# Patient Record
Sex: FEMALE | Race: WHITE | NOT HISPANIC OR LATINO | Employment: OTHER | URBAN - NONMETROPOLITAN AREA
[De-identification: names, ages, dates, MRNs, and addresses within clinical notes are randomized per-mention and may not be internally consistent; named-entity substitution may affect disease eponyms.]

---

## 2024-01-24 ENCOUNTER — TELEPHONE (OUTPATIENT)
Dept: NEUROLOGY | Facility: CLINIC | Age: 24
End: 2024-01-24
Payer: COMMERCIAL

## 2024-01-25 ENCOUNTER — OFFICE VISIT (OUTPATIENT)
Dept: NEUROLOGY | Facility: CLINIC | Age: 24
End: 2024-01-25
Payer: COMMERCIAL

## 2024-01-25 VITALS
HEART RATE: 88 BPM | BODY MASS INDEX: 46.1 KG/M2 | WEIGHT: 270 LBS | HEIGHT: 64 IN | OXYGEN SATURATION: 98 % | SYSTOLIC BLOOD PRESSURE: 130 MMHG | DIASTOLIC BLOOD PRESSURE: 84 MMHG

## 2024-01-25 DIAGNOSIS — G43.E19 INTRACTABLE CHRONIC MIGRAINE WITH AURA AND WITHOUT STATUS MIGRAINOSUS: Primary | ICD-10-CM

## 2024-01-25 PROCEDURE — 99205 OFFICE O/P NEW HI 60 MIN: CPT

## 2024-01-25 RX ORDER — NORETHINDRONE ACETATE AND ETHINYL ESTRADIOL 1MG-20(21)
1 KIT ORAL EVERY 24 HOURS
COMMUNITY
Start: 2023-08-16

## 2024-01-25 RX ORDER — DIPHENOXYLATE HYDROCHLORIDE AND ATROPINE SULFATE 2.5; .025 MG/1; MG/1
1 TABLET ORAL DAILY
COMMUNITY

## 2024-01-25 RX ORDER — LEVOTHYROXINE SODIUM 0.1 MG/1
100 TABLET ORAL
COMMUNITY
Start: 2023-11-13 | End: 2025-01-19

## 2024-01-25 RX ORDER — RIMEGEPANT SULFATE 75 MG/75MG
75 TABLET, ORALLY DISINTEGRATING ORAL ONCE AS NEEDED
Qty: 2 TABLET | Refills: 0 | COMMUNITY
Start: 2024-01-25

## 2024-01-25 RX ORDER — CABERGOLINE 0.5 MG/1
0.5 TABLET ORAL 2 TIMES WEEKLY
COMMUNITY
Start: 2023-08-16 | End: 2025-01-22

## 2024-01-25 RX ORDER — METHOCARBAMOL 500 MG/1
500 TABLET, FILM COATED ORAL 4 TIMES DAILY PRN
COMMUNITY
Start: 2023-07-23

## 2024-01-25 RX ORDER — SERTRALINE HYDROCHLORIDE 100 MG/1
100 TABLET, FILM COATED ORAL DAILY
COMMUNITY

## 2024-01-25 RX ORDER — RIMEGEPANT SULFATE 75 MG/75MG
75 TABLET, ORALLY DISINTEGRATING ORAL ONCE AS NEEDED
Qty: 16 TABLET | Refills: 5 | Status: SHIPPED | OUTPATIENT
Start: 2024-01-25 | End: 2024-07-23

## 2024-01-25 NOTE — PROGRESS NOTES
Neurology Consult Note    [facility]  [address], [address]  [place] [address]  Phone: [phone]  Fax: [phone]    Referring Provider:   BARON Nguyen  Primary Care Provider:  Kerry Gifford APRN    Reason for Consult:  Migraine  Subjective        Kimberlee Lindquist presents to [facility]    History of Present Illness  24-year-old female referred to me by BARON Castaneda for evaluation of migraine. Patient saw Ms. Garcia on 11/14/2023 for 3-month follow-up.  Additional review of record shows patient 1 referral for rheumatology and neurology.  Additionally patient requested an MIGDALIA test for lupus.  Additionally a MRI brain with and without contrast was needed before an Endo appointment due to known pituitary tumor.  Patient also on referral for mental health for anxiety medication.  Her PHQ-9 score was an 8.  TRACY-7 score 20.  She was started on sertraline 50 mg tablet.  Apparently patient was on amitriptyline as well however she stopped taking that several months prior.  Patient did request a new neurology referral as she was not satisfied with her prior neurologist.    On 8/16/2023 patient had established care with [facility] in [facility].  Review of that record does show patient has a history of Hashimoto's with a tumor on the pituitary gland.  Patient does have a history of vitamin D deficiency along with iron deficient anemia.  Patient did see neurology in [place].  Reportedly patient is also legally blind with retina pigmentosa.  Patient is able to see vision peripherally bilaterally.    She is treated on Dostinex for about 2-3 years.  Initially in 2021 patient presented with galactorrhea.  Pituitary MRI was performed in 2021 which showed a 4 mm adenoma.  She last saw endocrinology on 1/19/2024. No worsening of migraines with medication. Known side effect of migraine headaches.     Today patient presents with  her significant other.  She is ambulatory with the assistance of a walking stick due to being legally blind.  She reports to me her migraines originate in 2013.  These were not associated with any menstrual cycle or onset of puberty.  She denies any neck or head trauma.  She can experience 2 types of migraines per her report.  Typically her migraines will start on the left side of her head at her left eye.  She describes them as a sword going through her head.  The pain will then radiate globally around her head.  Addition this can feel sharp, throbbing.  These are associated with nausea, light and sound sensitivity.  Currently she is having approximately 25 migraine days a month.  She has noted triggers to include eye straining and superstrong smells.  Not for much intervention she has tried include retreating to a dark room and ice therapy.  This provided modest benefit.  She has used over-the-counter medications in the past however stopped using these due to no benefit.  She does drink approximately 0 to 200 mg of caffeine daily.  She does use tobacco.  She is able to admit quantity as she smokes for tobacco with her Hookah.  She does use alcohol occasionally.  Approximately 2 drinks weekly.  She also reports no family history of migraines.    Addition she can have migraine clusters per her report.  She describes these as severe ice pick in the back of her head.    She has seen neurology in [facility] in the past.  She reports suboptimal benefit with her treatment.    She denies any plans or reproduction.  She is on birth control.  We did have an extensive discussion regards to migraine preventative medications and the effects on developing fetus.    Preventative medications tried:  Verapamil.  Patient was on for approximately 3 months.  Low benefit.  Had significant side effects of constipation.  Propranolol.  Patient was on for approximately 3 months.  No clinical benefit.  Amitriptyline.  Patient  was on for approximately 3 months.  No clinical benefit.  Cymbalta.  Patient was on for approximately 3 to 4 months.  No clinical benefit.  Had significant side effects including suicidal ideation.  Topiramate.  Patient was on for approximately 3 to 4 months.  No clinical benefit.  Patient stopped due to interactions with her birth control.    Abortive medications tried:  Sumatriptan.  Unable to tolerate significant side effects of nausea and vomiting.    Medications contraindicated:   Additional anticonvulsants due to being of childbearing age.  Cymbalta due to suicidal ideation  Additional triptan medications due to concurrent use of Dostinex.    There is no problem list on file for this patient.       Past Medical History:   Diagnosis Date    Anxiety     Hashimoto's disease     Iron deficiency anemia     Major depressive disorder     Migraine     Prolactinoma     Retinitis pigmentosa     Vitamin D deficiency         Social History     Socioeconomic History    Marital status: Single   Tobacco Use    Smoking status: Every Day     Passive exposure: Current    Tobacco comments:     Hookah since 18 years old   Substance and Sexual Activity    Alcohol use: Yes     Alcohol/week: 2.0 standard drinks of alcohol     Types: 2 Standard drinks or equivalent per week    Drug use: Never    Sexual activity: Yes     Partners: Male     Birth control/protection: Birth control pill        Allergies   Allergen Reactions    Sumatriptan Nausea And Vomiting and Other (See Comments)    Penicillins Rash     Brother with whole body rash (hives)          Current Outpatient Medications:     cabergoline (DOSTINEX) 0.5 MG tablet, Take 1 tablet by mouth 2 (Two) Times a Week., Disp: , Rfl:     Cholecalciferol 50 MCG (2000 UT) capsule, Take 1 capsule by mouth Daily., Disp: , Rfl:     levothyroxine (SYNTHROID, LEVOTHROID) 100 MCG tablet, Take 1 tablet by mouth Every Morning., Disp: , Rfl:     methocarbamol (ROBAXIN) 500 MG tablet, Take 1 tablet by  "mouth 4 (Four) Times a Day As Needed., Disp: , Rfl:     multivitamin (THERAGRAN) tablet tablet, Take 1 tablet by mouth Daily., Disp: , Rfl:     norethindrone-ethinyl estradiol FE (Blisovi FE 1/20) 1-20 MG-MCG per tablet, Take 1 tablet by mouth Daily., Disp: , Rfl:     sertraline (ZOLOFT) 100 MG tablet, Take 1 tablet by mouth Daily., Disp: , Rfl:     galcanezumab-gnlm (EMGALITY) 120 MG/ML auto-injector pen, Inject 2 mL under the skin into the appropriate area as directed 1 (One) Time for 1 dose., Disp: 2 mL, Rfl: 0    galcanezumab-gnlm (EMGALITY) 120 MG/ML auto-injector pen, Inject 1 mL under the skin into the appropriate area as directed Every 30 (Thirty) Days., Disp: 1.12 mL, Rfl: 11    Rimegepant Sulfate (Nurtec) 75 MG tablet dispersible tablet, Take 1 tablet by mouth 1 (One) Time As Needed (migraine) for up to 180 days., Disp: 16 tablet, Rfl: 5    Rimegepant Sulfate (Nurtec) 75 MG tablet dispersible tablet, Take 1 tablet by mouth 1 (One) Time As Needed (migraine)., Disp: 2 tablet, Rfl: 0       Objective   Vital Signs:   /84 (BP Location: Left arm, Patient Position: Sitting, Cuff Size: Adult)   Pulse 88   Ht 162.6 cm (64\")   Wt 122 kg (270 lb)   SpO2 98%   BMI 46.35 kg/m²       Physical Exam  Vitals and nursing note reviewed.   Constitutional:       General: She is not in acute distress.     Appearance: Normal appearance.   HENT:      Head: Normocephalic.   Eyes:      General: Lids are normal.      Extraocular Movements: Extraocular movements intact.      Pupils: Pupils are equal, round, and reactive to light.   Pulmonary:      Effort: Pulmonary effort is normal. No respiratory distress.   Skin:     General: Skin is warm and dry.   Neurological:      Mental Status: She is alert.      Motor: Motor strength is normal.     Deep Tendon Reflexes: Reflexes are normal and symmetric.   Psychiatric:         Speech: Speech normal.        Neurological Exam  Mental Status  Alert. Oriented to person, place, time and " situation. Speech is normal. Language is fluent with no aphasia.    Cranial Nerves  CN II: No peripheral vision.  Patient has known retinal disease..  CN III, IV, VI: Extraocular movements intact bilaterally. Normal lids and orbits bilaterally. Pupils equal round and reactive to light bilaterally.  CN V: Facial sensation is normal.  CN VII: Full and symmetric facial movement.  CN IX, X: Palate elevates symmetrically. Normal gag reflex.  CN XI: Shoulder shrug strength is normal.  CN XII: Tongue midline without atrophy or fasciculations.    Motor  Normal muscle bulk throughout. No fasciculations present. Normal muscle tone. The following abnormal movements were seen: Hypermobility of joints.   Strength is 5/5 throughout all four extremities.    Sensory  Light touch is normal in upper and lower extremities.     Reflexes  Deep tendon reflexes are 2+ and symmetric in all four extremities.    Right pathological reflexes: Ankle clonus absent.  Left pathological reflexes: Ankle clonus absent.    Gait  Casual gait is normal including stance, stride, and arm swing.      Result Review :   The following data was reviewed by: BARON Avina on 01/25/2024:         PROGRESS NOTES - SCAN - PROG NOTE_[facility]_11/14/23 (11/14/2023)     PROGRESS NOTES - SCAN - PROG NOTE_[facility]_08/16/23 (08/16/2023)                Impression:  Kimberlee Lindquist is a 24 y.o. female who presents for evaluation of migraine.  After evaluating the patient, her presentation is diagnostic for intractable chronic migraine.  Patient does have approximate 25 migraine days monthly.  These are described as throbbing and sharp.  Associate with nausea, light and sound sensitivity.  Patient has been on several preventative medications in the past with limited benefit.  I do feel patient's best course of action is to initiate CRGP injectables such as Emgality.  Additionally due to patient being on Dostinex  and having interaction with triptan medications class, I would recommend patient trialing Nurtec for abortive therapy.  Patient was agreements with plan of care.  Additionally discussed with patient she will need to notify our office if she has any plans for reproduction as medications can affect this.    Diagnoses and all orders for this visit:    1. Intractable chronic migraine with aura and without status migrainosus (Primary)  -     galcanezumab-gnlm (EMGALITY) 120 MG/ML auto-injector pen; Inject 2 mL under the skin into the appropriate area as directed 1 (One) Time for 1 dose.  Dispense: 2 mL; Refill: 0  -     galcanezumab-gnlm (EMGALITY) 120 MG/ML auto-injector pen; Inject 1 mL under the skin into the appropriate area as directed Every 30 (Thirty) Days.  Dispense: 1.12 mL; Refill: 11  -     Rimegepant Sulfate (Nurtec) 75 MG tablet dispersible tablet; Take 1 tablet by mouth 1 (One) Time As Needed (migraine) for up to 180 days.  Dispense: 16 tablet; Refill: 5  -     Rimegepant Sulfate (Nurtec) 75 MG tablet dispersible tablet; Take 1 tablet by mouth 1 (One) Time As Needed (migraine).  Dispense: 2 tablet; Refill: 0        Plan:  Emgality 240 mg injection once  Followed by Emgality 120 mg monthly injection 30 days later.  Nurtec 75 mg ODT.  Take 1 tab at onset of migraine.  Max dose 1 tablet per 24 hours  Avoid known triggers  Limit use of tobacco  Limit use of alcohol  Obtain prior medical records  Follow-up with PCP as scheduled  Follow-up with me in 6 weeks via telehealth.    The patient and I have discussed the plan of care and she is in full agreement at this time.   I spent a total of 60 minutes this encounter.  Includes reviewing prior medical records, obtaining prior medical records, obtaining a thorough HPI, assessment of patient, developing a plan of care with the patient and her significant other, patient and significant other discussion, patient and significant other education as well as  documentation.  Follow Up   Return in about 6 weeks (around 3/7/2024) for Migraine.            Bk Díaz, APRN  01/25/24  09:29 CST

## 2024-01-26 ENCOUNTER — PATIENT ROUNDING (BHMG ONLY) (OUTPATIENT)
Dept: NEUROLOGY | Facility: CLINIC | Age: 24
End: 2024-01-26
Payer: COMMERCIAL

## 2024-01-26 NOTE — PROGRESS NOTES
January 26, 2024    Hello, may I speak with Kimberlee Lindquist?    My name is adi       I am  with [facility]  [address]  [address]  Located within [facility] [address] [phone].    Before we get started may I verify your date of birth? 2000    I am calling to officially welcome you to our practice and ask about your recent visit. Is this a good time to talk? yes    Tell me about your visit with us. What things went well?  All of my issues got addressed.       We're always looking for ways to make our patients' experiences even better. Do you have recommendations on ways we may improve?  no    Overall were you satisfied with your first visit to our practice? yes       I appreciate you taking the time to speak with me today. Is there anything else I can do for you? no      Thank you, and have a great day.

## 2024-02-07 DIAGNOSIS — G43.E19 INTRACTABLE CHRONIC MIGRAINE WITH AURA AND WITHOUT STATUS MIGRAINOSUS: Primary | ICD-10-CM

## 2024-02-12 ENCOUNTER — TELEPHONE (OUTPATIENT)
Dept: NEUROLOGY | Facility: CLINIC | Age: 24
End: 2024-02-12
Payer: COMMERCIAL

## 2024-03-07 ENCOUNTER — TELEMEDICINE (OUTPATIENT)
Dept: NEUROLOGY | Facility: CLINIC | Age: 24
End: 2024-03-07
Payer: MEDICAID

## 2024-03-07 DIAGNOSIS — G43.E19 INTRACTABLE CHRONIC MIGRAINE WITH AURA AND WITHOUT STATUS MIGRAINOSUS: Primary | ICD-10-CM

## 2024-03-07 NOTE — PROGRESS NOTES
Neurology Consult Note    [facility]  [address], [address]  [place] [address]  Phone: [phone]  Fax: [phone]    Referring Provider:   No ref. provider found  Primary Care Provider:  Kerry Gifford APRN    Reason for Consult:  Migraine  Subjective        Kimberlee Lindquist presents to [facility]    History of Present Illness  24-year-old female who I see for chronic migraine.  She was last seen in clinic on 1/25/2024.  At that time we had elected to initiate Emgality for prevention.  Additionally had initiated Nurtec as an abortive agent.    Today patient was seen via telehealth.  Patient was at her home and I was in my office.    Patient today reports she is not able to get her Emgality loading dose.  Apparently her insurance denied this.  She has elected to save month 1 injection to use with months to as her loading dose.  She also reports good improvement with her Nurtec.  There is no problem list on file for this patient.       Past Medical History:   Diagnosis Date    Anxiety     Hashimoto's disease     Iron deficiency anemia     Major depressive disorder     Migraine     Prolactinoma     Retinitis pigmentosa     Vitamin D deficiency         Social History     Socioeconomic History    Marital status: Single   Tobacco Use    Smoking status: Every Day     Passive exposure: Current    Tobacco comments:     Hookah since 18 years old   Vaping Use    Vaping status: Never Used   Substance and Sexual Activity    Alcohol use: Yes     Alcohol/week: 2.0 standard drinks of alcohol     Types: 2 Standard drinks or equivalent per week    Drug use: Never    Sexual activity: Yes     Partners: Male     Birth control/protection: Birth control pill        Allergies   Allergen Reactions    Sumatriptan Nausea And Vomiting and Other (See Comments)    Penicillins Rash     Brother with whole body rash (hives)          Current Outpatient Medications:     cabergoline (DOSTINEX)  Pharmacist at [facility] pharmacy called stating \"I will not fill patient medication at my store, I do not fill comfortable.\"   0.5 MG tablet, Take 1 tablet by mouth 2 (Two) Times a Week., Disp: , Rfl:     Cholecalciferol 50 MCG (2000 UT) capsule, Take 1 capsule by mouth Daily., Disp: , Rfl:     levothyroxine (SYNTHROID, LEVOTHROID) 100 MCG tablet, Take 1 tablet by mouth Every Morning., Disp: , Rfl:     methocarbamol (ROBAXIN) 500 MG tablet, Take 1 tablet by mouth 4 (Four) Times a Day As Needed., Disp: , Rfl:     multivitamin (THERAGRAN) tablet tablet, Take 1 tablet by mouth Daily., Disp: , Rfl:     norethindrone-ethinyl estradiol FE (Blisovi FE 1/20) 1-20 MG-MCG per tablet, Take 1 tablet by mouth Daily., Disp: , Rfl:     Rimegepant Sulfate (Nurtec) 75 MG tablet dispersible tablet, Take 1 tablet by mouth 1 (One) Time As Needed (migraine) for up to 180 days., Disp: 16 tablet, Rfl: 5    Rimegepant Sulfate (Nurtec) 75 MG tablet dispersible tablet, Take 1 tablet by mouth 1 (One) Time As Needed (migraine)., Disp: 2 tablet, Rfl: 0    sertraline (ZOLOFT) 100 MG tablet, Take 1 tablet by mouth Daily., Disp: , Rfl:     galcanezumab-gnlm (EMGALITY) 120 MG/ML auto-injector pen, Inject 1 mL under the skin into the appropriate area as directed Every 30 (Thirty) Days. (Patient not taking: Reported on 3/7/2024), Disp: 1.12 mL, Rfl: 11       Objective   Vital Signs:   There were no vitals taken for this visit.    Limited exam due to telehealth  Physical Exam  Vitals and nursing note reviewed.   Constitutional:       Appearance: Normal appearance.   Pulmonary:      Effort: Pulmonary effort is normal. No respiratory distress.   Neurological:      Mental Status: She is alert.   Psychiatric:         Speech: Speech normal.        Neurological Exam  Mental Status  Alert. Oriented to person, place, time and situation. Speech is normal. Language is fluent with no aphasia.    Cranial Nerves  CN VII: Full and symmetric facial movement.      Result Review :   The following data was reviewed by: BARON Avina on 03/07/2024:         Progress Notes by Arjun  BARON Childers (01/25/2024 08:30)                  Impression:  Kimberlee Lindquist is a 24 y.o. female who presents for chronic migraine.  Unfortunately insurance has denied a loading dose of her Emgality.  She will take the loading dose next month when she gets her second injection.  She does enjoy Nurtec as it does decrease the severity of her pain.    Diagnoses and all orders for this visit:    1. Intractable chronic migraine with aura and without status migrainosus (Primary)        Plan:  Await loading dose of Emgality  Continue Emgality monthly injections  Continue Nurtec  Follow-up with PCP as scheduled  Follow-up me in 3 months or sooner if needed    The patient and I have discussed the plan of care and she is in full agreement at this time.     Follow Up   Return in about 3 months (around 6/7/2024) for Migraine.            BARON Avina  03/07/24  15:51 CST

## 2024-04-08 DIAGNOSIS — G43.E19 INTRACTABLE CHRONIC MIGRAINE WITH AURA AND WITHOUT STATUS MIGRAINOSUS: ICD-10-CM

## 2024-06-13 ENCOUNTER — TELEMEDICINE (OUTPATIENT)
Dept: NEUROLOGY | Facility: CLINIC | Age: 24
End: 2024-06-13
Payer: COMMERCIAL

## 2024-06-13 DIAGNOSIS — G43.E19 INTRACTABLE CHRONIC MIGRAINE WITH AURA AND WITHOUT STATUS MIGRAINOSUS: Primary | ICD-10-CM

## 2024-06-13 NOTE — PROGRESS NOTES
Neurology Consult Note    [facility] [place] [place] [facility]  [address], [address]  [place] [address]  Phone: [phone]  Fax: [phone]    Referring Provider:   No ref. provider found  Primary Care Provider:  eKrry Gifford APRN    Reason for Consult:  Chronic migraine  Subjective        Kimberlee Lindquist presents to [facility]    History of Present Illness  24-year-old female who I see for chronic migraine.  Currently have patient on Emgality and Nurtec.  She was last seen telehealth on 3/7/2024.  At that visit patient reported insurance denied a loading dose of Emgality.  She was saving her dose for which she gets to injectors at home to start.  Prior to starting regimen she was having approximately 25 migraine days monthly.    Today patient was seen via telehealth.  She was at home when I was in my office.  She reports me good improvement with the use of Emgality and Nurtec.  She has had 2 injections.  Her Emgality has decreased her migraine days down to 4 out of 30.  Additionally Nurtec does help with abortive agent.  She has noticed the last week she will have worsening migraines.  She reports good sleep.  No concerns or safety voiced.    Preventative medications tried:  Verapamil.  Patient was on for approximately 3 months.  Low benefit.  Had significant side effects of constipation.  Propranolol.  Patient was on for approximately 3 months.  No clinical benefit.  Amitriptyline.  Patient was on for approximately 3 months.  No clinical benefit.  Cymbalta.  Patient was on for approximately 3 to 4 months.  No clinical benefit.  Had significant side effects including suicidal ideation.  Topiramate.  Patient was on for approximately 3 to 4 months.  No clinical benefit.  Patient stopped due to interactions with her birth control.     Abortive medications tried:  Sumatriptan.  Unable to tolerate significant side effects of nausea and vomiting.     Medications contraindicated:    Additional anticonvulsants due to being of childbearing age.  Cymbalta due to suicidal ideation  Additional triptan medications due to concurrent use of Dostinex.  There is no problem list on file for this patient.       Past Medical History:   Diagnosis Date    Anxiety     Hashimoto's disease     Iron deficiency anemia     Major depressive disorder     Migraine     Prolactinoma     Retinitis pigmentosa     Vitamin D deficiency         Social History     Socioeconomic History    Marital status: Single   Tobacco Use    Smoking status: Every Day     Passive exposure: Current    Tobacco comments:     Hookah since 18 years old   Vaping Use    Vaping status: Never Used   Substance and Sexual Activity    Alcohol use: Yes     Alcohol/week: 2.0 standard drinks of alcohol     Types: 2 Standard drinks or equivalent per week    Drug use: Never    Sexual activity: Yes     Partners: Male     Birth control/protection: Birth control pill        Allergies   Allergen Reactions    Sumatriptan Nausea And Vomiting and Other (See Comments)    Penicillins Rash     Brother with whole body rash (hives)          Current Outpatient Medications:     cabergoline (DOSTINEX) 0.5 MG tablet, Take 1 tablet by mouth 2 (Two) Times a Week., Disp: , Rfl:     Cholecalciferol 50 MCG (2000 UT) capsule, Take 1 capsule by mouth Daily., Disp: , Rfl:     galcanezumab-gnlm (EMGALITY) 120 MG/ML auto-injector pen, Inject 1 mL under the skin into the appropriate area as directed Every 30 (Thirty) Days., Disp: 1.12 mL, Rfl: 11    levothyroxine (SYNTHROID, LEVOTHROID) 100 MCG tablet, Take 1 tablet by mouth Every Morning., Disp: , Rfl:     methocarbamol (ROBAXIN) 500 MG tablet, Take 1 tablet by mouth 4 (Four) Times a Day As Needed., Disp: , Rfl:     multivitamin (THERAGRAN) tablet tablet, Take 1 tablet by mouth Daily., Disp: , Rfl:     norethindrone-ethinyl estradiol FE (Blisovi FE 1/20) 1-20 MG-MCG per tablet, Take 1 tablet by mouth Daily., Disp: , Rfl:      Rimegepant Sulfate (Nurtec) 75 MG tablet dispersible tablet, Take 1 tablet by mouth 1 (One) Time As Needed (migraine) for up to 180 days., Disp: 16 tablet, Rfl: 5    Rimegepant Sulfate (Nurtec) 75 MG tablet dispersible tablet, Take 1 tablet by mouth 1 (One) Time As Needed (migraine)., Disp: 2 tablet, Rfl: 0    sertraline (ZOLOFT) 100 MG tablet, Take 1 tablet by mouth Daily., Disp: , Rfl:        Objective   Vital Signs:   There were no vitals taken for this visit.    Limited exam due to telehealth visit  Physical Exam  Vitals and nursing note reviewed.   Constitutional:       Appearance: Normal appearance.   HENT:      Head: Normocephalic.   Eyes:      General: Lids are normal.   Pulmonary:      Effort: Pulmonary effort is normal. No respiratory distress.   Skin:     General: Skin is warm and dry.   Neurological:      Mental Status: She is alert.   Psychiatric:         Speech: Speech normal.        Neurological Exam  Mental Status  Alert. Oriented to person, place, time and situation. Speech is normal. Language is fluent with no aphasia.    Cranial Nerves  CN III, IV, VI: Normal lids and orbits bilaterally.  CN VII: Full and symmetric facial movement.      Result Review :   The following data was reviewed by: BARON Avina on 2024:       Progress Notes by Bk Díaz APRN (2024 13:45)                  Impression:  Kimberlee Lindquist is a 24 y.o. female who presents for follow-up of chronic migraine.  She responded well to Emgality and Nurtec.  For the last week during the 4-week period, did recommend her to try to take a Nurtec every other day to help cover.    Diagnoses and all orders for this visit:    1. Intractable chronic migraine with aura and without status migrainosus (Primary)        Plan:  Continue Emgality 20 mg monthly injection for migraine prevention  Continue Nurtec 75 mg for migraine   During the last week before Emgality injection, take Nurtec 75 mg every other  day  Follow-up with PCP  Follow-up in our clinic in 6 months or sooner if needed    The patient and I have discussed the plan of care and she is in full agreement at this time.     Follow Up   No follow-ups on file.            Bk Díaz, APRN  06/13/24  13:30 CDT

## 2025-01-07 ENCOUNTER — TELEMEDICINE (OUTPATIENT)
Dept: NEUROLOGY | Facility: CLINIC | Age: 25
End: 2025-01-07
Payer: COMMERCIAL

## 2025-01-07 DIAGNOSIS — G43.E19 INTRACTABLE CHRONIC MIGRAINE WITH AURA AND WITHOUT STATUS MIGRAINOSUS: ICD-10-CM

## 2025-01-07 PROCEDURE — 99214 OFFICE O/P EST MOD 30 MIN: CPT

## 2025-01-07 RX ORDER — CHOLECALCIFEROL (VITAMIN D3) 25 MCG
CAPSULE ORAL
COMMUNITY

## 2025-01-07 NOTE — PROGRESS NOTES
Neurology Consult Note    [facility] [place] [facility]  [address], [address]  [place] [address]  Phone: [phone]  Fax: [phone]    Referring Provider:   No ref. provider found  Primary Care Provider:  Kerry Gifford APRN    Reason for Consult:  Chronic Migraine   Subjective        Kimberlee Lindquist presents to [facility]    History of Present Illness    24-year-old female seen for follow-up of chronic migraine.  Last seen in clinic 6/13/2024 via telemedicine.  Currently patient's migraine regimen is Emgality for prevention and Nurtec for abortive agent.  She was having approximate 25 migraine days monthly were able to get her down to 4/30.    Today patient presents via telemedicine.  She is at her home in [place] and I am in my office.  Reports to me continued control with her chronic migraines.  Continues to tolerate Emgality and Nurtec without difficulty.  Only reporting 2-3 migraine days a month.  Very satisfied this response.  No needs today.      Preventative medications tried:  Verapamil.  Patient was on for approximately 3 months.  Low benefit.  Had significant side effects of constipation.  Propranolol.  Patient was on for approximately 3 months.  No clinical benefit.  Amitriptyline.  Patient was on for approximately 3 months.  No clinical benefit.  Cymbalta.  Patient was on for approximately 3 to 4 months.  No clinical benefit.  Had significant side effects including suicidal ideation.  Topiramate.  Patient was on for approximately 3 to 4 months.  No clinical benefit.  Patient stopped due to interactions with her birth control.     Abortive medications tried:  Sumatriptan.  Unable to tolerate significant side effects of nausea and vomiting.     Medications contraindicated:   Additional anticonvulsants due to being of childbearing age.  Cymbalta due to suicidal ideation  Additional triptan medications due to concurrent use of Dostinex.  There is no problem list on  file for this patient.       Past Medical History:   Diagnosis Date    Anxiety     Hashimoto's disease     Iron deficiency anemia     Major depressive disorder     Migraine     Prolactinoma     Retinitis pigmentosa     Vitamin D deficiency         Social History     Socioeconomic History    Marital status: Single   Tobacco Use    Smoking status: Every Day     Passive exposure: Current    Tobacco comments:     Hookah since 18 years old   Vaping Use    Vaping status: Never Used   Substance and Sexual Activity    Alcohol use: Yes     Alcohol/week: 2.0 standard drinks of alcohol     Types: 2 Standard drinks or equivalent per week    Drug use: Never    Sexual activity: Yes     Partners: Male     Birth control/protection: Birth control pill        Allergies   Allergen Reactions    Sumatriptan Nausea And Vomiting and Other (See Comments)    Penicillins Rash     Brother with whole body rash (hives)          Current Outpatient Medications:     cabergoline (DOSTINEX) 0.5 MG tablet, Take 1 tablet by mouth 2 (Two) Times a Week., Disp: , Rfl:     galcanezumab-gnlm (EMGALITY) 120 MG/ML auto-injector pen, Inject 1 mL under the skin into the appropriate area as directed Every 30 (Thirty) Days., Disp: 1.12 mL, Rfl: 11    levothyroxine (SYNTHROID, LEVOTHROID) 100 MCG tablet, Take 1 tablet by mouth Every Morning., Disp: , Rfl:     methocarbamol (ROBAXIN) 500 MG tablet, Take 1 tablet by mouth 4 (Four) Times a Day As Needed., Disp: , Rfl:     multivitamin (THERAGRAN) tablet tablet, Take 1 tablet by mouth Daily., Disp: , Rfl:     norethindrone-ethinyl estradiol FE (Blisovi FE 1/20) 1-20 MG-MCG per tablet, Take 1 tablet by mouth Daily., Disp: , Rfl:     rimegepant sulfate (Nurtec) 75 MG tablet, Take 1 tablet by mouth 1 (One) Time As Needed (migraine)., Disp: 16 tablet, Rfl: 8    Cholecalciferol (Vitamin D-3) 25 MCG (1000 UT) capsule, Take  by mouth., Disp: , Rfl:     Cholecalciferol 50 MCG (2000 UT) capsule, Take 1 capsule by mouth  Daily., Disp: , Rfl:        Objective   Vital Signs:   There were no vitals taken for this visit.      Physical Exam  Vitals and nursing note reviewed.   Constitutional:       Appearance: Normal appearance.   Pulmonary:      Effort: No respiratory distress.   Neurological:      Mental Status: She is alert.   Psychiatric:         Speech: Speech normal.        Neurological Exam  Mental Status  Alert. Oriented to person, place, time and situation. Speech is normal. Language is fluent with no aphasia.    Cranial Nerves  CN VII: Full and symmetric facial movement.      Result Review :   The following data was reviewed by: BARON Avina on 2025:       Progress Notes by Bk Díaz APRN (2024 13:30)                  Impression:  Kimberlee Lindquist is a 24 y.o. female who presents for follow-up of chronic migraine.  She is stable with the use of Emgality and Nurtec.  We will make no changes to her regimen.    Diagnoses and all orders for this visit:    1. Intractable chronic migraine with aura and without status migrainosus  -     rimegepant sulfate (Nurtec) 75 MG tablet; Take 1 tablet by mouth 1 (One) Time As Needed (migraine).  Dispense: 16 tablet; Refill: 8        Plan:  Continue Emgality 120 mg monthly injection for chronic migraine prophylaxis  Continue Nurtec 75 mg ODT as needed for chronic migraine   Avoid known triggers  Follow-up with primary care as scheduled  Follow-up our clinic 6 months or sooner if needed    The patient and I have discussed the plan of care and she is in full agreement at this time.     Follow Up   Return in about 6 months (around 2025) for Migraine.            BARON Avina  25  13:38 CST

## 2025-04-20 DIAGNOSIS — G43.E19 INTRACTABLE CHRONIC MIGRAINE WITH AURA AND WITHOUT STATUS MIGRAINOSUS: ICD-10-CM

## 2025-04-22 ENCOUNTER — TELEPHONE (OUTPATIENT)
Dept: NEUROLOGY | Facility: CLINIC | Age: 25
End: 2025-04-22
Payer: COMMERCIAL

## 2025-04-22 DIAGNOSIS — G43.E19 INTRACTABLE CHRONIC MIGRAINE WITH AURA AND WITHOUT STATUS MIGRAINOSUS: ICD-10-CM

## 2025-04-22 RX ORDER — GALCANEZUMAB 120 MG/ML
INJECTION, SOLUTION SUBCUTANEOUS
Qty: 1 ML | Refills: 0 | OUTPATIENT
Start: 2025-04-22

## 2025-06-27 ENCOUNTER — TELEMEDICINE (OUTPATIENT)
Dept: NEUROLOGY | Facility: CLINIC | Age: 25
End: 2025-06-27
Payer: MEDICAID

## 2025-06-27 DIAGNOSIS — G43.E19 INTRACTABLE CHRONIC MIGRAINE WITH AURA AND WITHOUT STATUS MIGRAINOSUS: Primary | ICD-10-CM

## 2025-06-27 RX ORDER — DESVENLAFAXINE 100 MG/1
100 TABLET, EXTENDED RELEASE ORAL EVERY MORNING
COMMUNITY

## 2025-06-27 RX ORDER — TRAZODONE HYDROCHLORIDE 100 MG/1
100-200 TABLET ORAL NIGHTLY PRN
COMMUNITY

## 2025-06-27 NOTE — PROGRESS NOTES
Neurology Consult Note    [facility]  [address], [address]  [place] [address]  Phone: [phone]  Fax: [phone]    Referring Provider:   No ref. provider found  Primary Care Provider:  Kerry Gifford APRN    Reason for Consult:  Chronic migraine  Subjective        History of Present Illness  25-year-old female seen for the follow-up of chronic migraine.  Last seen in my clinic on 1/7/2025.  Reviewed that record shows that patient maintained on Emgality and Nurtec.  Previously she was having 25 migraine days monthly and we were able to get her down to 4 migraine days monthly.  She is reporting 2-3 migraines a month.    Today patient presents via telehealth.  She is at her home in [place] and I am in the office in [facility].  Reports to me since being seen last, still having good control of her migraines.  Averaging 2 migraines a month.  Identified triggers include changes in barometric pressure.  She notes continued success with Nurtec as her abortive agent.  She is also pleased with the Emgality.    Preventative medications tried:  Verapamil.  Patient was on for approximately 3 months.  Low benefit.  Had significant side effects of constipation.  Propranolol.  Patient was on for approximately 3 months.  No clinical benefit.  Amitriptyline.  Patient was on for approximately 3 months.  No clinical benefit.  Cymbalta.  Patient was on for approximately 3 to 4 months.  No clinical benefit.  Had significant side effects including suicidal ideation.  Topiramate.  Patient was on for approximately 3 to 4 months.  No clinical benefit.  Patient stopped due to interactions with her birth control.  Emgality.  Good clinical benefit.     Abortive medications tried:  Sumatriptan.  Unable to tolerate significant side effects of nausea and vomiting.  Nurtec.  Good clinical benefit.     Medications contraindicated:   Additional anticonvulsants due to being of childbearing  age.  Cymbalta due to suicidal ideation  Additional triptan medications due to concurrent use of Dostinex.    There is no problem list on file for this patient.       Past Medical History:   Diagnosis Date    Anxiety     Hashimoto's disease     Iron deficiency anemia     Major depressive disorder     Migraine     Prolactinoma     Retinitis pigmentosa     Vitamin D deficiency         Social History     Socioeconomic History    Marital status: Single   Tobacco Use    Smoking status: Every Day     Passive exposure: Current    Tobacco comments:     Hookah since 18 years old   Vaping Use    Vaping status: Never Used   Substance and Sexual Activity    Alcohol use: Yes     Alcohol/week: 2.0 standard drinks of alcohol     Types: 2 Standard drinks or equivalent per week    Drug use: Never    Sexual activity: Yes     Partners: Male     Birth control/protection: Birth control pill        Allergies   Allergen Reactions    Sumatriptan Nausea And Vomiting and Other (See Comments)    Penicillins Rash     Brother with whole body rash (hives)          Current Outpatient Medications:     cabergoline (DOSTINEX) 0.5 MG tablet, Take 1 tablet by mouth 2 (Two) Times a Week., Disp: , Rfl:     Cholecalciferol (Vitamin D-3) 25 MCG (1000 UT) capsule, Take  by mouth., Disp: , Rfl:     Cholecalciferol 50 MCG (2000 UT) capsule, Take 1 capsule by mouth Daily., Disp: , Rfl:     desvenlafaxine (PRISTIQ) 100 MG 24 hr tablet, Take 1 tablet by mouth Every Morning., Disp: , Rfl:     galcanezumab-gnlm (EMGALITY) 120 MG/ML auto-injector pen, Inject 1 mL under the skin into the appropriate area as directed Every 30 (Thirty) Days., Disp: 1.12 mL, Rfl: 11    levothyroxine (SYNTHROID, LEVOTHROID) 100 MCG tablet, Take 1 tablet by mouth Every Morning., Disp: , Rfl:     methocarbamol (ROBAXIN) 500 MG tablet, Take 1 tablet by mouth 4 (Four) Times a Day As Needed., Disp: , Rfl:     multivitamin (THERAGRAN) tablet tablet, Take 1 tablet by mouth Daily., Disp: ,  Rfl:     norethindrone-ethinyl estradiol FE (Blisovi FE ) 1-20 MG-MCG per tablet, Take 1 tablet by mouth Daily., Disp: , Rfl:     rimegepant sulfate (Nurtec) 75 MG tablet, Take 1 tablet by mouth 1 (One) Time As Needed (migraine)., Disp: 16 tablet, Rfl: 8    traZODone (DESYREL) 100 MG tablet, Take 1-2 tablets by mouth At Night As Needed. for sleep, Disp: , Rfl:        Objective   Vital Signs:   There were no vitals taken for this visit.      Physical Exam  Vitals and nursing note reviewed.   Constitutional:       Appearance: Normal appearance.   HENT:      Head: Normocephalic.   Eyes:      General: Lids are normal.   Pulmonary:      Effort: Pulmonary effort is normal. No respiratory distress.   Skin:     General: Skin is warm and dry.   Neurological:      Mental Status: She is alert.   Psychiatric:         Speech: Speech normal.        Neurological Exam  Mental Status  Alert. Oriented to person, place, time and situation. Speech is normal. Language is fluent with no aphasia.    Cranial Nerves  CN III, IV, VI: Normal lids and orbits bilaterally.  CN VII: Full and symmetric facial movement.      Result Review :   The following data was reviewed by: BARON Avina on 2025:       Telemedicine with Bk Díaz APRN (2025)                Impression:  Kimberlee Lindquist is a 25 y.o. female who presents for follow-up of chronic migraine.  Overall good control with Emgality and Nurtec.  Will continue current regimen.  No red flags.    Diagnoses and all orders for this visit:    1. Intractable chronic migraine with aura and without status migrainosus (Primary)        Plan:  Continue Emgality 120 mg monthly injection for chronic migraine prevention  Continue Nurtec 75 mg ODT as needed for migraine   If desires for reduction, will need to stop Emgality for at least 5 months  Contact our office for any worsening migraines  Follow-up with primary care provider as scheduled  Follow-up in my clinic  6 months or sooner if needed    The patient and I have discussed the plan of care and she is in full agreement at this time.     Follow Up   Return in about 6 months (around 12/27/2025) for Migraine.            BARON Avina  06/27/25  09:43 CDT

## 2025-06-27 NOTE — PATIENT INSTRUCTIONS
Continue Nurtec and Emgality for your migraines.  If in the future you decide for reproduction, you will need to stop Emgality for at least 5 months.